# Patient Record
Sex: MALE | Race: OTHER | URBAN - METROPOLITAN AREA
[De-identification: names, ages, dates, MRNs, and addresses within clinical notes are randomized per-mention and may not be internally consistent; named-entity substitution may affect disease eponyms.]

---
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
## 2020-08-04 ENCOUNTER — OFFICE VISIT (OUTPATIENT)
Dept: OPTOMETRY | Facility: CLINIC | Age: 20
End: 2020-08-04
Payer: COMMERCIAL

## 2020-08-04 DIAGNOSIS — H52.13 MYOPIA OF BOTH EYES: Primary | ICD-10-CM

## 2020-08-04 PROCEDURE — 92015 DETERMINE REFRACTIVE STATE: CPT | Performed by: OPTOMETRIST

## 2020-08-04 PROCEDURE — 92002 INTRM OPH EXAM NEW PATIENT: CPT | Performed by: OPTOMETRIST

## 2020-08-04 NOTE — PATIENT INSTRUCTIONS
Myopia  New glasses RX given to update as needed. Patient will go to [facility] provider for contact lenses.

## 2020-08-04 NOTE — PROGRESS NOTES
Violet Boeck is a 20year old male. HPI:     HPI     Patient is in for an annual eye exam. Last EE was at [facility] in [address] [place] [address] for contacts about a year ago. He will continue to see them for CL's through [employer].  He  wants to get a backu Exam       Right Left    External Normal Normal          Slit Lamp Exam       Right Left    Lids/Lashes Meibomian gland dysfunction Meibomian gland dysfunction    Conjunctiva/Sclera Normal Normal    Cornea Clear Clear    Anterior Chamber Deep and quiet Yahaira Maine

## 2020-08-04 NOTE — ASSESSMENT & PLAN NOTE
New glasses RX given to update as needed. Patient will go to [facility] provider for contact lenses.

## (undated) NOTE — LETTER
VACCINE ADMINISTRATION RECORD  PARENT / GUARDIAN APPROVAL  Date: 2018  Vaccine administered to: Kristyn Arias     : 10/8/2000    MRN: [id number]    A copy of the appropriate Centers for Disease Control and Prevention Vaccine Information statement

## (undated) NOTE — LETTER
5/31/2018              Nguyen Prime  (10/08/2000)          Immunization History   Administered Date(s) Administered   • DTAP 12/13/1999, 04/03/2000, 06/12/2000, 01/16/2001, 08/09/2004   • HEP A 08/09/2004, 03/12/2005   • HEP B 10/16/2000   • HEP B

## (undated) NOTE — MR AVS SNAPSHOT
[facility] [place] [address] [id number] [phone]               Thank you for choosing us for your health care visit with Luz Martini MD.  We are glad to serve you and happy to provide you with this summary of Proxy Access to your child’s MyChart go to [email or web address] and click on the   Sign Up Forms link in the Additional Information box on the right. MyChart Questions? Call [phone] for help.   MyChart is NOT to be used for urgent needs o Limiting fast food, take out food, and eating out at restaurants  o Preparing foods at home as a family  o Eating a diet rich in calcium  o Eating a high fiber diet    Help your children form healthy habits.   Healthy active children are more likely to be

## (undated) NOTE — LETTER
2018              Zion Nashuz        [address] [id number]        : 10/08/2000      Below you will find an updated immunization record for Zion Payan.     Immunization History   Administered Date(s) Administered   • DT

## (undated) NOTE — LETTER
VACCINE ADMINISTRATION RECORD  PARENT / GUARDIAN APPROVAL  Date: 2018  Vaccine administered to: Shantal Heart     : 10/8/2000    MRN: [id number]    A copy of the appropriate Centers for Disease Control and Prevention Vaccine Information statement